# Patient Record
Sex: MALE | Race: WHITE | NOT HISPANIC OR LATINO | Employment: PART TIME | URBAN - METROPOLITAN AREA
[De-identification: names, ages, dates, MRNs, and addresses within clinical notes are randomized per-mention and may not be internally consistent; named-entity substitution may affect disease eponyms.]

---

## 2022-09-13 ENCOUNTER — APPOINTMENT (OUTPATIENT)
Dept: ULTRASOUND IMAGING | Facility: CLINIC | Age: 27
End: 2022-09-13
Attending: EMERGENCY MEDICINE
Payer: COMMERCIAL

## 2022-09-13 ENCOUNTER — HOSPITAL ENCOUNTER (EMERGENCY)
Facility: CLINIC | Age: 27
Discharge: HOME | End: 2022-09-13
Attending: EMERGENCY MEDICINE | Admitting: EMERGENCY MEDICINE
Payer: COMMERCIAL

## 2022-09-13 VITALS
SYSTOLIC BLOOD PRESSURE: 152 MMHG | WEIGHT: 219 LBS | HEART RATE: 100 BPM | BODY MASS INDEX: 32.44 KG/M2 | DIASTOLIC BLOOD PRESSURE: 80 MMHG | HEIGHT: 69 IN | RESPIRATION RATE: 12 BRPM | TEMPERATURE: 98.5 F | OXYGEN SATURATION: 95 %

## 2022-09-13 DIAGNOSIS — S30.21XA CONTUSION OF PENIS, INITIAL ENCOUNTER: ICD-10-CM

## 2022-09-13 LAB
ALBUMIN UR-MCNC: 50 MG/DL
APPEARANCE UR: CLEAR
BILIRUB UR QL STRIP: NEGATIVE
COLOR UR AUTO: YELLOW
GLUCOSE UR STRIP-MCNC: NEGATIVE MG/DL
HGB UR QL STRIP: ABNORMAL
KETONES UR STRIP-MCNC: NEGATIVE MG/DL
LEUKOCYTE ESTERASE UR QL STRIP: NEGATIVE
MUCOUS THREADS #/AREA URNS LPF: PRESENT /LPF
NITRATE UR QL: NEGATIVE
PH UR STRIP: 5.5 [PH] (ref 5–7)
RBC URINE: 2 /HPF
SP GR UR STRIP: 1.03 (ref 1–1.03)
UROBILINOGEN UR STRIP-MCNC: NORMAL MG/DL
WBC URINE: <1 /HPF

## 2022-09-13 PROCEDURE — 87591 N.GONORRHOEAE DNA AMP PROB: CPT | Performed by: EMERGENCY MEDICINE

## 2022-09-13 PROCEDURE — 87491 CHLMYD TRACH DNA AMP PROBE: CPT | Performed by: EMERGENCY MEDICINE

## 2022-09-13 PROCEDURE — 99285 EMERGENCY DEPT VISIT HI MDM: CPT | Performed by: EMERGENCY MEDICINE

## 2022-09-13 PROCEDURE — 250N000013 HC RX MED GY IP 250 OP 250 PS 637: Performed by: EMERGENCY MEDICINE

## 2022-09-13 PROCEDURE — 81001 URINALYSIS AUTO W/SCOPE: CPT | Performed by: EMERGENCY MEDICINE

## 2022-09-13 PROCEDURE — 99285 EMERGENCY DEPT VISIT HI MDM: CPT | Mod: 25 | Performed by: EMERGENCY MEDICINE

## 2022-09-13 PROCEDURE — 93976 VASCULAR STUDY: CPT

## 2022-09-13 PROCEDURE — 76870 US EXAM SCROTUM: CPT | Mod: 26 | Performed by: RADIOLOGY

## 2022-09-13 RX ORDER — DIAZEPAM 5 MG
5 TABLET ORAL
Qty: 3 TABLET | Refills: 0 | Status: SHIPPED | OUTPATIENT
Start: 2022-09-13

## 2022-09-13 RX ORDER — IBUPROFEN 600 MG/1
600 TABLET, FILM COATED ORAL EVERY 6 HOURS PRN
Qty: 30 TABLET | Refills: 0 | Status: SHIPPED | OUTPATIENT
Start: 2022-09-13

## 2022-09-13 RX ORDER — ACETAMINOPHEN 500 MG
1000 TABLET ORAL ONCE
Status: COMPLETED | OUTPATIENT
Start: 2022-09-13 | End: 2022-09-13

## 2022-09-13 RX ADMIN — ACETAMINOPHEN 1000 MG: 500 TABLET ORAL at 15:50

## 2022-09-13 ASSESSMENT — ACTIVITIES OF DAILY LIVING (ADL)
ADLS_ACUITY_SCORE: 35
ADLS_ACUITY_SCORE: 35

## 2022-09-13 NOTE — ED NOTES
ED Triage Provider Note  [facility]  Encounter Date: Sep 13, 2022    History:  Chief Complaint   Patient presents with     Pelvic Pain     Ian B Magill is a 27 year old male with a past medical history of long covid (x 1 month) who presents to the ED with a chief complaint of penile injury. Occurred 2 days ago when he rolled over onto his erection while sleeping. Woke up yesterday with constant 3/10 pain to groin. Urination is normal. Penis initally appeared buried after the injury per the patient, it is returning to a more normal appearance now. Pain has been mild, but bothersome. Seen at urgent care earlier. They did not think it was c/w fracture. No swelling or purple discoloration. Pt states that he had not had an erection since this happened, he tried to achieve one at home after being advised to do so by the urgent care doctor, and came here as he was not able to achieve an erection. A/w BL testicular pain also.     Review of Systems:  General: No fevers or chills  Skin: No rash or diaphoresis  Eyes: No eye redness or discharge  Ears/Nose/Throat: No rhinorrhea or nasal congestion  Respiratory: No cough or SOB  Cardiovascular: No chest pain or palpitations  Gastrointestinal: No nausea, vomiting, or diarrhea  Genitourinary: See HPI  Musculoskeletal: No arthralgias or myalgias  Neurologic: No numbness or weakness  Hematologic/Lymphatic/Immunologic: No leg swelling, no easy bruising/bleeding  Endocrine: No polyuria/polydypsia    Exam:  There were no vitals taken for this visit.  General: No acute distress. Appears stated age. NAD  HEENT: EOMI, anicteric, NCAT  Neck: Supple. Normal ROM  Cardio: Regular rate, extremities well perfused  Resp: Normal work of breathing, grossly normal respiratory rate  Skin: Normal color, no rash noted  Psych: Normal mood and affect  Neuro: AxOx3, moving all extremities  : TTP along penile shaft and BL testicles, no swelling or skin  discoloration, uncircumcised. Penis does not appear buried.     Medical Decision Making:  Patient arriving to the ED with problem as above. A medical screening exam was performed. US and medication orders initiated from Triage, urology paged. The patient is appropriate to wait in triage.    Ariana Robb MD on 9/13/2022 at 2:33 PM      Ariana Robb MD  09/13/22 1500

## 2022-09-13 NOTE — ED PROVIDER NOTES
History     Chief Complaint   Patient presents with     Pelvic Pain     HPI  Ian B Magill is a 27 year old male with a past medical history of long covid (x 1 month) who presents to the ED with a chief complaint of penile injury. Occurred 2 days ago when he rolled over onto his erection while sleeping. Woke up yesterday with constant 3/10 pain to groin. Urination is normal. Penis initally appeared buried after the injury per the patient, it is returning to a more normal appearance now. Pain has been mild, but bothersome. Seen at urgent care earlier. They did not think it was c/w fracture. No swelling or purple discoloration. Pt states that he had not had an erection since this happened, he tried to achieve one at home after being advised to do so by the urgent care doctor, and came here as he was not able to achieve an erection. A/w BL testicular pain also. Pt did get urology referral through urgent care visit earlier.     I have reviewed the Medications, Allergies, Past Medical and Surgical History, and Social History in the Epic system.    No past medical history on file.  No past surgical history on file.  Current Facility-Administered Medications   Medication     acetaminophen (TYLENOL) tablet 1,000 mg     No current outpatient medications on file.     No Known Allergies  Past medical history, past surgical history, medications, and allergies were reviewed with the patient. Additional pertinent items: None    Social History     Socioeconomic History     Marital status: Single     Spouse name: Not on file     Number of children: Not on file     Years of education: Not on file     Highest education level: Not on file   Occupational History     Not on file   Tobacco Use     Smoking status: Not on file     Smokeless tobacco: Not on file   Substance and Sexual Activity     Alcohol use: Not on file     Drug use: Not on file     Sexual activity: Not on file   Other Topics Concern     Not on file   Social History  "Narrative     Not on file     Social Determinants of Health     Financial Resource Strain: Not on file   Food Insecurity: Not on file   Transportation Needs: Not on file   Physical Activity: Not on file   Stress: Not on file   Social Connections: Not on file   Intimate Partner Violence: Not on file   Housing Stability: Not on file     Social history was reviewed with the patient. Additional pertinent items: None    Review of Systems  General: No fevers or chills  Skin: No rash or diaphoresis  Eyes: No eye redness or discharge  Ears/Nose/Throat: No rhinorrhea or nasal congestion  Respiratory: No cough or SOB  Cardiovascular: No chest pain or palpitations  Gastrointestinal: No nausea, vomiting, or diarrhea  Genitourinary: See HPI  Musculoskeletal: No arthralgias or myalgias  Neurologic: No numbness or weakness  Hematologic/Lymphatic/Immunologic: No leg swelling, no easy bruising/bleeding  Endocrine: No polyuria/polydypsia    A complete review of systems was performed with pertinent positives and negatives noted in the HPI, and all other systems negative.    Physical Exam   BP: (!) 152/80  Pulse: 100  Temp: 98.5  F (36.9  C)  Resp: 12  Height: 175.3 cm (5' 9\")  Weight: 99.3 kg (219 lb)  SpO2: 95 %      General: No acute distress. Appears stated age. NAD  HEENT: EOMI, anicteric, NCAT  Neck: Supple. Normal ROM  Cardio: Regular rate, extremities well perfused  Resp: Normal work of breathing, grossly normal respiratory rate  Skin: Normal color, no rash noted  Psych: Normal mood and affect  Neuro: AxOx3, moving all extremities  : TTP along penile shaft and BL testicles, no swelling or skin discoloration, uncircumcised. Penis does not appear buried.     ED Course        Procedures                          Labs Ordered and Resulted from Time of ED Arrival to Time of ED Departure - No data to display         No results found for this or any previous visit (from the past 24 hour(s)).    Labs, vital signs, and imaging studies " were reviewed by me.    Medications   acetaminophen (TYLENOL) tablet 1,000 mg (has no administration in time range)       Assessments & Plan (with Medical Decision Making)   Ian B Magill is a 27 year old male who presents to the emergency department with penile and bilateral testicular pain.  Patient does report recent minor trauma, when he rolled over onto his erection in bed.  The patient does not have any findings consistent with penile fracture on exam.  Differential diagnosis would include local soft tissue contusion, epididymoorchitis, STI, testicular torsion.  Ultrasound ordered to further evaluate the patient.  Will discuss with urology.  Tylenol given for pain control in the emergency department.  Patient reports taking ibuprofen prior to arrival.    Patient was discussed with urology, they recommend obtaining urinalysis, STI testing, and postvoid residual volume as well.  If work-up is negative, patient may be discharged home.  They recommend follow-up with PCP with referral to urology if symptoms do not resolve.    Ultrasound is normal.    Urinalysis does not appear consistent with UTI.  Gonorrhea/chlamydia testing was sent.    Post void residual volume is 80 ml    I have reviewed the nursing notes.    I have reviewed the findings, diagnosis, plan and need for follow up with the patient.    Patient to be discharged home. Advised to follow up with PCP within 1 week. Referral to urology clinic also given per pt request. To return to ER immediately with any new/worsening symptoms. Plan of care discussed with patient who expresses understanding and agrees with plan of care.      New Prescriptions    No medications on file       Final diagnoses:   Contusion of penis, initial encounter     Ariana Robb MD  9/13/2022   [facility] EMERGENCY DEPARTMENT     Ariana Robb MD  09/13/22 9239

## 2022-09-13 NOTE — DISCHARGE INSTRUCTIONS
TODAY'S VISIT:  You were seen today for penile pain   -   - If you had any labs or imaging/radiology tests performed today, you should also discuss these tests with your usual provider.     FOLLOW-UP:  Please make an appointment to follow up with:  - Your Primary Care Provider. If you do not have a PCP, please call the Primary Care Center (phone: [phone] for an appointment  - If your symptoms do not resolve, you may need to follow up with urology as well   Urology Clinic (phone: [phone])    - Have your provider review the results from today's visit with you again to make sure no further follow-up or additional testing is needed based on those results.     PRESCRIPTIONS / MEDICATIONS:  - You may take Ibuprofen and/or Tylenol as needed for pain. You can take 600 mg of Ibuprofen every 6 hours and 1 g Tylenol every 6 hours. It may help to alternate these, so you take something every 3 hours.     RETURN TO THE EMERGENCY DEPARTMENT  Return to the Emergency Department at any time for any new or worsening symptoms or any concerns.

## 2022-09-14 LAB
C TRACH DNA SPEC QL NAA+PROBE: NEGATIVE
N GONORRHOEA DNA SPEC QL NAA+PROBE: NEGATIVE

## 2022-10-03 ENCOUNTER — HEALTH MAINTENANCE LETTER (OUTPATIENT)
Age: 27
End: 2022-10-03

## 2023-10-22 ENCOUNTER — HEALTH MAINTENANCE LETTER (OUTPATIENT)
Age: 28
End: 2023-10-22

## 2024-12-15 ENCOUNTER — HEALTH MAINTENANCE LETTER (OUTPATIENT)
Age: 29
End: 2024-12-15